# Patient Record
Sex: MALE | Race: WHITE | Employment: OTHER | URBAN - METROPOLITAN AREA
[De-identification: names, ages, dates, MRNs, and addresses within clinical notes are randomized per-mention and may not be internally consistent; named-entity substitution may affect disease eponyms.]

---

## 2018-09-07 ENCOUNTER — OFFICE VISIT (OUTPATIENT)
Dept: INTERNAL MEDICINE | Facility: CLINIC | Age: 28
End: 2018-09-07
Payer: COMMERCIAL

## 2018-09-07 VITALS
SYSTOLIC BLOOD PRESSURE: 120 MMHG | DIASTOLIC BLOOD PRESSURE: 82 MMHG | WEIGHT: 233.4 LBS | HEIGHT: 71 IN | RESPIRATION RATE: 16 BRPM | HEART RATE: 112 BPM | BODY MASS INDEX: 32.68 KG/M2 | OXYGEN SATURATION: 95 % | TEMPERATURE: 98.1 F

## 2018-09-07 DIAGNOSIS — Z00.00 ROUTINE HISTORY AND PHYSICAL EXAMINATION OF ADULT: Primary | ICD-10-CM

## 2018-09-07 PROCEDURE — 99395 PREV VISIT EST AGE 18-39: CPT | Mod: 25 | Performed by: FAMILY MEDICINE

## 2018-09-07 PROCEDURE — 90471 IMMUNIZATION ADMIN: CPT | Performed by: FAMILY MEDICINE

## 2018-09-07 PROCEDURE — 90715 TDAP VACCINE 7 YRS/> IM: CPT | Performed by: FAMILY MEDICINE

## 2018-09-07 NOTE — NURSING NOTE
"/82  Pulse 112  Temp 98.1  F (36.7  C) (Oral)  Resp 16  Ht 5' 11\" (1.803 m)  Wt 233 lb 6.4 oz (105.9 kg)  SpO2 95%  BMI 32.55 kg/m2  Patient in for annual male physical.  Jennifer Barnes CMA    "

## 2018-09-07 NOTE — MR AVS SNAPSHOT
After Visit Summary   9/7/2018    Bravo Ortega    MRN: [id number]           Patient Information     Date Of Birth          1990        Visit Information        Provider Department      9/7/2018 12:40 PM Scott Zaidi MD [facility]        Today's Diagnoses     Routine history and physical examination of adult    -  1      Care Instructions      Preventive Health Recommendations  Male Ages 26 - 39    Yearly exam:             See your health care provider every year in order to  o   Review health changes.   o   Discuss preventive care.    o   Review your medicines if your doctor has prescribed any.    You should be tested each year for STDs (sexually transmitted diseases), if you re at risk.     After age 35, talk to your provider about cholesterol testing. If you are at risk for heart disease, have your cholesterol tested at least every 5 years.     If you are at risk for diabetes, you should have a diabetes test (fasting glucose).  Shots: Get a flu shot each year. Get a tetanus shot every 10 years.     Nutrition:    Eat at least 5 servings of fruits and vegetables daily.     Eat whole-grain bread, whole-wheat pasta and brown rice instead of white grains and rice.     Get adequate Calcium and Vitamin D.     Lifestyle    Exercise for at least 150 minutes a week (30 minutes a day, 5 days a week). This will help you control your weight and prevent disease.     Limit alcohol to one drink per day.     No smoking.     Wear sunscreen to prevent skin cancer.     See your dentist every six months for an exam and cleaning.             Follow-ups after your visit        Future tests that were ordered for you today     Open Future Orders        Priority Expected Expires Ordered    Comprehensive metabolic panel (BMP + Alb, Alk Phos, ALT, AST, Total. Bili, TP) Routine 9/10/2018 9/28/2018 9/7/2018    Lipid Profile (Chol, Trig, HDL, LDL calc) Routine 9/10/2018 9/28/2018 9/7/2018     "**Testosterone Free and Total FUTURE anytime Routine 9/10/2018 2018 2018            Who to contact     If you have questions or need follow up information about today's clinic visit or your schedule please contact [facility] directly at [phone].  Normal or non-critical lab and imaging results will be communicated to you by MyChart, letter or phone within 4 business days after the clinic has received the results. If you do not hear from us within 7 days, please contact the clinic through [employer] or phone. If you have a critical or abnormal lab result, we will notify you by phone as soon as possible.  Submit refill requests through [employer] or call your pharmacy and they will forward the refill request to us. Please allow 3 business days for your refill to be completed.          Additional Information About Your Visit        MyChart Information     [employer] lets you send messages to your doctor, view your test results, renew your prescriptions, schedule appointments and more. To sign up, go to [email or web address] . Click on \"Log in\" on the left side of the screen, which will take you to the Welcome page. Then click on \"Sign up Now\" on the right side of the page.     You will be asked to enter the access code listed below, as well as some personal information. Please follow the directions to create your username and password.     Your access code is: [email or web address]  Expires: 2018 12:42 PM     Your access code will  in 90 days. If you need help or a new code, please call your [facility] or [phone].        Care EveryWhere ID     This is your Care EveryWhere ID. This could be used by other organizations to access your [place] medical records  [id number]        Your Vitals Were     Pulse Temperature Respirations Height Pulse Oximetry BMI (Body Mass Index)    112 98.1  F (36.7  C) (Oral) 16 5' 11\" (1.803 m) 95% 32.55 kg/m2       Blood Pressure from Last 3 Encounters: "   09/07/18 120/82    Weight from Last 3 Encounters:   09/07/18 233 lb 6.4 oz (105.9 kg)               Primary Care Provider Fax #    Physician No Ref-Primary [phone]       No address on file        Equal Access to Services     SAADIA IVORY : Abhay ami pinto saulo Soomaali, waaxda luqadaha, qaybta kaalmada adeegyada, david carbajalashley gandarachelly catalan boogie melendez. So [facility] [phone].    ATENCIÓN: Si habla español, tiene a bae disposición servicios gratuitos de asistencia lingüística. Llame al [phone].    We comply with applicable federal civil rights laws and [place] laws. We do not discriminate on the basis of race, color, national origin, age, disability, sex, sexual orientation, or gender identity.            Thank you!     Thank you for choosing [facility]  for your care. Our goal is always to provide you with excellent care. Hearing back from our patients is one way we can continue to improve our services. Please take a few minutes to complete the written survey that you may receive in the mail after your visit with us. Thank you!             Your Updated Medication List - Protect others around you: Learn how to safely use, store and throw away your medicines at www.disposemymeds.org.      Notice  As of 9/7/2018  1:17 PM    You have not been prescribed any medications.

## 2018-09-07 NOTE — PROGRESS NOTES
SUBJECTIVE:   CC: Bravo Ortega is an 27 year old male who presents for preventative health visit.     Physical   Annual:     Getting at least 3 servings of Calcium per day:  Yes    Bi-annual eye exam:  Yes    Dental care twice a year:  Yes    Sleep apnea or symptoms of sleep apnea:  None    Diet:  Regular (no restrictions)    Frequency of exercise:  6-7 days/week    Duration of exercise:  30-45 minutes    Taking medications regularly:  Not Applicable    Additional concerns today:  YES            Today's PHQ-2 Score:   PHQ-2 ( 1999 [employer]) 9/7/2018   Q1: Little interest or pleasure in doing things 0   Q2: Feeling down, depressed or hopeless 0   PHQ-2 Score 0   Q1: Little interest or pleasure in doing things Not at all   Q2: Feeling down, depressed or hopeless Not at all   PHQ-2 Score 0       Abuse: Current or Past(Physical, Sexual or Emotional)- No  Do you feel safe in your environment - Yes    Social History   Substance Use Topics     Smoking status: Never Smoker     Smokeless tobacco: Never Used     Alcohol use Yes     Alcohol Use 9/7/2018   If you drink alcohol do you typically have greater than 3 drinks per day OR greater than 7 drinks per week? No   No flowsheet data found.    Last PSA: No results found for: PSA    Reviewed orders with patient. Reviewed health maintenance and updated orders accordingly - Yes      Reviewed and updated as needed this visit by clinical staff  Tobacco  Allergies  Meds  Med Hx  Surg Hx  Fam Hx  Soc Hx        Reviewed and updated as needed this visit by Provider            Review of Systems  CONSTITUTIONAL: NEGATIVE for fever, chills, change in weight  INTEGUMENTARY/SKIN: NEGATIVE for worrisome rashes, moles or lesions  EYES: NEGATIVE for vision changes or irritation  ENT: NEGATIVE for ear, mouth and throat problems  RESP: NEGATIVE for significant cough or SOB  CV: NEGATIVE for chest pain, palpitations or peripheral edema  GI: NEGATIVE for nausea, abdominal pain, heartburn,  "or change in bowel habits   male: negative for dysuria, hematuria, decreased urinary stream, erectile dysfunction, urethral discharge  MUSCULOSKELETAL: NEGATIVE for significant arthralgias or myalgia  NEURO: NEGATIVE for weakness, dizziness or paresthesias  PSYCHIATRIC: NEGATIVE for changes in mood or affect    OBJECTIVE:   /82  Pulse 112  Temp 98.1  F (36.7  C) (Oral)  Resp 16  Ht 5' 11\" (1.803 m)  Wt 233 lb 6.4 oz (105.9 kg)  SpO2 95%  BMI 32.55 kg/m2    Physical Exam  GENERAL: healthy, alert and no distress  EYES: Eyes grossly normal to inspection, PERRL and conjunctivae and sclerae normal  HENT: ear canals and TM's normal, nose and mouth without ulcers or lesions  NECK: no adenopathy, no asymmetry, masses, or scars and thyroid normal to palpation  RESP: lungs clear to auscultation - no rales, rhonchi or wheezes  CV: regular rate and rhythm, normal S1 S2, no S3 or S4, no murmur, click or rub, no peripheral edema and peripheral pulses strong  ABDOMEN: soft, nontender, no hepatosplenomegaly, no masses and bowel sounds normal   (male): normal male genitalia without lesions or urethral discharge, no hernia  MS: no gross musculoskeletal defects noted, no edema  SKIN: no suspicious lesions or rashes  NEURO: Normal strength and tone, mentation intact and speech normal  PSYCH: mentation appears normal, affect normal/bright    Diagnostic Test Results:  none     ASSESSMENT/PLAN:   1. Routine history and physical examination of adult    - Comprehensive metabolic panel (BMP + Alb, Alk Phos, ALT, AST, Total. Bili, TP); Future  - Lipid Profile (Chol, Trig, HDL, LDL calc); Future  - **Testosterone Free and Total FUTURE anytime; Future  - TDAP VACCINE (ADACEL)    COUNSELING:   Reviewed preventive health counseling, as reflected in patient instructions       Regular exercise       Healthy diet/nutrition    BP Readings from Last 1 Encounters:   09/07/18 120/82     Estimated body mass index is 32.55 kg/(m^2) as " "calculated from the following:    Height as of this encounter: 5' 11\" (1.803 m).    Weight as of this encounter: 233 lb 6.4 oz (105.9 kg).      Weight management plan: low carb, exer     reports that he has never smoked. He has never used smokeless tobacco.      Counseling Resources:  ATP IV Guidelines  Pooled Cohorts Equation Calculator  FRAX Risk Assessment  ICSI Preventive Guidelines  Dietary Guidelines for Americans, 2010  USDA's MyPlate  ASA Prophylaxis  Lung CA Screening    Scott Zaidi MD  [facility]  Answers for HPI/ROS submitted by the patient on 9/7/2018   PHQ-2 Score: 0    "

## 2018-09-12 DIAGNOSIS — Z00.00 ROUTINE HISTORY AND PHYSICAL EXAMINATION OF ADULT: ICD-10-CM

## 2018-09-12 PROCEDURE — 36415 COLL VENOUS BLD VENIPUNCTURE: CPT | Performed by: FAMILY MEDICINE

## 2018-09-12 PROCEDURE — 80061 LIPID PANEL: CPT | Performed by: FAMILY MEDICINE

## 2018-09-12 PROCEDURE — 80053 COMPREHEN METABOLIC PANEL: CPT | Performed by: FAMILY MEDICINE

## 2018-09-12 PROCEDURE — 84270 ASSAY OF SEX HORMONE GLOBUL: CPT | Performed by: FAMILY MEDICINE

## 2018-09-12 PROCEDURE — 84403 ASSAY OF TOTAL TESTOSTERONE: CPT | Performed by: FAMILY MEDICINE

## 2018-09-13 LAB
ALBUMIN SERPL-MCNC: 3.7 G/DL (ref 3.4–5)
ALP SERPL-CCNC: 21 U/L (ref 40–150)
ALT SERPL W P-5'-P-CCNC: 28 U/L (ref 0–70)
ANION GAP SERPL CALCULATED.3IONS-SCNC: 9 MMOL/L (ref 3–14)
AST SERPL W P-5'-P-CCNC: 20 U/L (ref 0–45)
BILIRUB SERPL-MCNC: 0.4 MG/DL (ref 0.2–1.3)
BUN SERPL-MCNC: 15 MG/DL (ref 7–30)
CALCIUM SERPL-MCNC: 8.5 MG/DL (ref 8.5–10.1)
CHLORIDE SERPL-SCNC: 106 MMOL/L (ref 94–109)
CHOLEST SERPL-MCNC: 174 MG/DL
CO2 SERPL-SCNC: 26 MMOL/L (ref 20–32)
CREAT SERPL-MCNC: 1.12 MG/DL (ref 0.66–1.25)
GFR SERPL CREATININE-BSD FRML MDRD: 77 ML/MIN/1.7M2
GLUCOSE SERPL-MCNC: 85 MG/DL (ref 70–99)
HDLC SERPL-MCNC: 39 MG/DL
LDLC SERPL CALC-MCNC: 119 MG/DL
NONHDLC SERPL-MCNC: 135 MG/DL
POTASSIUM SERPL-SCNC: 4.3 MMOL/L (ref 3.4–5.3)
PROT SERPL-MCNC: 7.2 G/DL (ref 6.8–8.8)
SODIUM SERPL-SCNC: 141 MMOL/L (ref 133–144)
TRIGL SERPL-MCNC: 79 MG/DL

## 2018-09-14 ENCOUNTER — MYC MEDICAL ADVICE (OUTPATIENT)
Dept: INTERNAL MEDICINE | Facility: CLINIC | Age: 28
End: 2018-09-14

## 2018-09-17 LAB
SHBG SERPL-SCNC: 23 NMOL/L (ref 11–80)
TESTOST FREE SERPL-MCNC: 10.32 NG/DL (ref 4.7–24.4)
TESTOST SERPL-MCNC: 417 NG/DL (ref 240–950)

## 2019-12-09 ENCOUNTER — HEALTH MAINTENANCE LETTER (OUTPATIENT)
Age: 29
End: 2019-12-09

## 2021-04-21 ENCOUNTER — IMMUNIZATION (OUTPATIENT)
Dept: NURSING | Facility: CLINIC | Age: 31
End: 2021-04-21
Payer: COMMERCIAL

## 2021-04-21 PROCEDURE — 0001A PR COVID VAC PFIZER DIL RECON 30 MCG/0.3 ML IM: CPT

## 2021-04-21 PROCEDURE — 91300 PR COVID VAC PFIZER DIL RECON 30 MCG/0.3 ML IM: CPT

## 2021-05-12 ENCOUNTER — IMMUNIZATION (OUTPATIENT)
Dept: NURSING | Facility: CLINIC | Age: 31
End: 2021-05-12
Attending: INTERNAL MEDICINE
Payer: COMMERCIAL

## 2021-05-12 PROCEDURE — 91300 PR COVID VAC PFIZER DIL RECON 30 MCG/0.3 ML IM: CPT

## 2021-05-12 PROCEDURE — 0002A PR COVID VAC PFIZER DIL RECON 30 MCG/0.3 ML IM: CPT

## 2021-08-09 ENCOUNTER — OFFICE VISIT (OUTPATIENT)
Dept: URGENT CARE | Facility: URGENT CARE | Age: 31
End: 2021-08-09
Payer: COMMERCIAL

## 2021-08-09 VITALS
TEMPERATURE: 98 F | OXYGEN SATURATION: 98 % | SYSTOLIC BLOOD PRESSURE: 151 MMHG | BODY MASS INDEX: 35.15 KG/M2 | DIASTOLIC BLOOD PRESSURE: 100 MMHG | WEIGHT: 252 LBS | HEART RATE: 94 BPM

## 2021-08-09 DIAGNOSIS — R05.9 COUGH: Primary | ICD-10-CM

## 2021-08-09 DIAGNOSIS — J02.9 PHARYNGITIS, UNSPECIFIED ETIOLOGY: ICD-10-CM

## 2021-08-09 PROCEDURE — U0003 INFECTIOUS AGENT DETECTION BY NUCLEIC ACID (DNA OR RNA); SEVERE ACUTE RESPIRATORY SYNDROME CORONAVIRUS 2 (SARS-COV-2) (CORONAVIRUS DISEASE [COVID-19]), AMPLIFIED PROBE TECHNIQUE, MAKING USE OF HIGH THROUGHPUT TECHNOLOGIES AS DESCRIBED BY CMS-2020-01-R: HCPCS | Performed by: STUDENT IN AN ORGANIZED HEALTH CARE EDUCATION/TRAINING PROGRAM

## 2021-08-09 PROCEDURE — 99213 OFFICE O/P EST LOW 20 MIN: CPT | Performed by: STUDENT IN AN ORGANIZED HEALTH CARE EDUCATION/TRAINING PROGRAM

## 2021-08-09 PROCEDURE — U0005 INFEC AGEN DETEC AMPLI PROBE: HCPCS | Performed by: STUDENT IN AN ORGANIZED HEALTH CARE EDUCATION/TRAINING PROGRAM

## 2021-08-09 RX ORDER — PREDNISONE 10 MG/1
TABLET ORAL
Qty: 8 TABLET | Refills: 0 | Status: SHIPPED | OUTPATIENT
Start: 2021-08-09 | End: 2021-08-14

## 2021-08-10 LAB — SARS-COV-2 RNA RESP QL NAA+PROBE: NEGATIVE

## 2021-08-10 NOTE — PATIENT INSTRUCTIONS
Patient Education     Acute Viral Pharyngitis (Sore Throat)    You or your child have a sore throat (pharyngitis). This infection is caused by a virus. It can cause throat pain that is worse when swallowing, aching all over, headache, and fever. The infection may be spread by coughing, kissing, or touching others after touching your mouth or nose. Antibiotic medicines don't work against viruses. They are not used for treating this illness.   Home care    If symptoms are severe, you or your child should rest at home. Return to work or school when you, or your child, feel well enough.     You or your child should drink plenty of fluids to prevent dehydration.    Adults, and children 5 years and older, can use throat lozenges or numbing throat sprays to help reduce pain. Gargling with warm salt water will also help reduce throat pain. Dissolve 1/2 teaspoon of salt in 1 glass of warm water. Children can sip on juice or an ice pop. Children 5 years and older can also suck on a lollipop or hard candy. (Hard candy and lozenges can be a choking hazard in children younger than 5 years.)    Don t eat salty or spicy foods or give them to your child. These can be irritating to the throat.  Medicines for a child: You can give your child acetaminophen for fever, fussiness, or discomfort. In babies over 6 months of age, you may use ibuprofen as well as acetaminophen. If your child has chronic liver or kidney disease or ever had a stomach ulcer or gastrointestinal bleeding, talk with your child s healthcare provider before giving these medicines. Aspirin should never be used by any child under 18 years of age who has a fever. It may cause severe liver damage and death. Don't give your child any other medicine without first asking your child's healthcare provider, especially the first time.   Medicines for an adult: You may use acetaminophen, naproxen, or ibuprofen to control pain or fever, unless another medicine was prescribed for  this. If you have chronic liver or kidney disease or ever had a stomach ulcer or gastrointestinal bleeding, talk with your healthcare provider before using these medicines.   Follow-up care  Follow up with a healthcare provider or as advised if you or your child are not getting better over the next week.   When to get medical advice  Call your healthcare provider right away if any of these occur:     Fever (see Fever and children, below)     New or worsening ear pain, sinus pain, or headache    Painful lumps in the back of neck    Stiff neck    Lymph nodes are getting larger    Can t open mouth wide due to throat pain    New rash    Other symptoms are getting worse  Call 911  Call 911 or get medical care right away if any of these occur:       Trouble breathing or noisy breathing    Muffled voice    Can't swallow liquids, a lot of drooling, or any other symptoms that may mean worsening swelling in the throat    Signs of dehydration such as very dark urine or no urine, sunken eyes, dizziness    Fever and children  Use a digital thermometer to check your child s temperature. Don t use a mercury thermometer. There are different kinds and uses of digital thermometers. They include:     Rectal. For children younger than 3 years, a rectal temperature is the most accurate.    Forehead (temporal).  This works for children age 3 months and older. If a child under 3 months old has signs of illness, this can be used for a first pass. The provider may want to confirm with a rectal temperature.    Ear (tympanic). Ear temperatures are accurate after 6 months of age, but not before.    Armpit (axillary).  This is the least reliable but may be used for a first pass to check a child of any age with signs of illness. The provider may want to confirm with a rectal temperature.    Mouth (oral). Don t use a thermometer in your child s mouth until he or she is at least 4 years old.  Use the rectal thermometer with care. Follow the  product maker s directions for correct use. Insert it gently. Label it and make sure it s not used in the mouth. It may pass on germs from the stool. If you don t feel OK using a rectal thermometer, ask the healthcare provider what type to use instead. When you talk with any healthcare provider about your child s fever, tell him or her which type you used.   Below are guidelines to know if your young child has a fever. Your child s healthcare provider may give you different numbers for your child. Follow your provider s specific instructions.   Fever readings for a baby under 3 months old:     First, ask your child s healthcare provider how you should take the temperature.    Rectal or forehead: 100.4 F (38 C) or higher    Armpit: 99 F (37.2 C) or higher  Fever readings for a child age 3 months to 36 months (3 years):     Rectal, forehead, or ear: 102 F (38.9 C) or higher    Armpit: 101 F (38.3 C) or higher  Call the healthcare provider in these cases:     Repeated temperature of 104 F (40 C) or higher in a child of any age    Fever of 100.4 or higher in baby younger than 3 months    Fever that lasts more than 24 hours in a child under age 2    Fever that lasts for 3 days in a child age 2 or older  Sly last reviewed this educational content on 2/1/2020

## 2021-08-10 NOTE — PROGRESS NOTES
Assessment & Plan   Cough  Vaccinated but given Delta variant will test for Covid.  - Symptomatic COVID-19 Virus (Coronavirus) by PCR Nasopharyngeal    Pharyngitis, unspecified etiology  Suspect viral vs allergic etiology. Low suspicion for sinusitis or other bacterial pathology. Recommended short course of prednisone given degree of throat pain and hoarseness.   - predniSONE (DELTASONE) 10 MG tablet  Dispense: 8 tablet; Refill: 0           Return if symptoms worsen or fail to improve.    Loreta Preciado MD  [facility] MARCOS Cordon is a 30 year old male who presents to clinic today for the following health issues:  Chief Complaint   Patient presents with     Urgent Care     Sinus Problem     had sinus infection 2 weeks ago,congested, coughing up     HPI    URI Adult  Sore throat, productive cough, fatigue  Started 3 weeks go and was told viral and take zyrtec and helped initially; hoarse voice  Has not been on antibiotics  No fevers (taking temp daily)  Sleep is ok; very tired past week and hald  Appetite suppressed a little  Worked out 2x last week  Onset of symptoms was 3 week(s) ago.  Course of illness is worsening.    Severity mild  Current and Associated symptoms: cough - productive, sore throat, hoarse voice and fatigue  Treatment measures tried include Tylenol/Ibuprofen, Decongestants and Antihistamine.  Predisposing factors include seasonal allergies.      Review of Systems  Constitutional, HEENT, cardiovascular, pulmonary, gi  systems are negative, except as otherwise noted.      Objective    BP (!) 151/100   Pulse 94   Temp 98  F (36.7  C) (Tympanic)   Wt 114.3 kg (252 lb)   SpO2 98%   BMI 35.15 kg/m    Physical Exam   GENERAL: healthy, alert and no distress  EYES: Eyes grossly normal to inspection, conjunctivae and sclerae normal  HENT: ear canals and TM's normal, nose and mouth without ulcers or lesions  NECK: no adenopathyn  RESP: lungs clear to auscultation  - no rales, rhonchi or wheezes  CV: regular rate and rhythm, normal S1 S2, no S3 or S4, no murmur, click or rub  ABDOMEN: soft, nontender  MS: no gross musculoskeletal defects noted, no edema  SKIN: no suspicious lesions or rashes

## 2021-08-29 ENCOUNTER — HEALTH MAINTENANCE LETTER (OUTPATIENT)
Age: 31
End: 2021-08-29

## 2021-09-16 ENCOUNTER — OFFICE VISIT (OUTPATIENT)
Dept: URGENT CARE | Facility: URGENT CARE | Age: 31
End: 2021-09-16

## 2021-09-16 VITALS
HEART RATE: 86 BPM | DIASTOLIC BLOOD PRESSURE: 85 MMHG | SYSTOLIC BLOOD PRESSURE: 137 MMHG | OXYGEN SATURATION: 98 % | TEMPERATURE: 98.5 F

## 2021-09-16 DIAGNOSIS — R07.0 THROAT PAIN: ICD-10-CM

## 2021-09-16 DIAGNOSIS — R05.9 COUGH: ICD-10-CM

## 2021-09-16 DIAGNOSIS — R06.02 SHORTNESS OF BREATH: Primary | ICD-10-CM

## 2021-09-16 DIAGNOSIS — R07.9 ACUTE CHEST PAIN: ICD-10-CM

## 2021-09-16 LAB
BASOPHILS # BLD AUTO: 0 10E3/UL (ref 0–0.2)
BASOPHILS NFR BLD AUTO: 0 %
D DIMER PPP FEU-MCNC: <0.27 UG/ML FEU (ref 0–0.5)
DEPRECATED S PYO AG THROAT QL EIA: NEGATIVE
EOSINOPHIL # BLD AUTO: 0.1 10E3/UL (ref 0–0.7)
EOSINOPHIL NFR BLD AUTO: 2 %
ERYTHROCYTE [DISTWIDTH] IN BLOOD BY AUTOMATED COUNT: 12.4 % (ref 10–15)
HCT VFR BLD AUTO: 45 % (ref 40–53)
HGB BLD-MCNC: 15 G/DL (ref 13.3–17.7)
LYMPHOCYTES # BLD AUTO: 2.8 10E3/UL (ref 0.8–5.3)
LYMPHOCYTES NFR BLD AUTO: 36 %
MCH RBC QN AUTO: 28.8 PG (ref 26.5–33)
MCHC RBC AUTO-ENTMCNC: 33.3 G/DL (ref 31.5–36.5)
MCV RBC AUTO: 86 FL (ref 78–100)
MONOCYTES # BLD AUTO: 0.9 10E3/UL (ref 0–1.3)
MONOCYTES NFR BLD AUTO: 12 %
NEUTROPHILS # BLD AUTO: 3.9 10E3/UL (ref 1.6–8.3)
NEUTROPHILS NFR BLD AUTO: 51 %
PLATELET # BLD AUTO: 308 10E3/UL (ref 150–450)
RBC # BLD AUTO: 5.21 10E6/UL (ref 4.4–5.9)
TROPONIN I SERPL-MCNC: <0.015 UG/L (ref 0–0.04)
WBC # BLD AUTO: 7.7 10E3/UL (ref 4–11)

## 2021-09-16 PROCEDURE — U0005 INFEC AGEN DETEC AMPLI PROBE: HCPCS | Performed by: NURSE PRACTITIONER

## 2021-09-16 PROCEDURE — 85025 COMPLETE CBC W/AUTO DIFF WBC: CPT | Performed by: NURSE PRACTITIONER

## 2021-09-16 PROCEDURE — 93000 ELECTROCARDIOGRAM COMPLETE: CPT | Performed by: NURSE PRACTITIONER

## 2021-09-16 PROCEDURE — 99215 OFFICE O/P EST HI 40 MIN: CPT | Performed by: NURSE PRACTITIONER

## 2021-09-16 PROCEDURE — 84484 ASSAY OF TROPONIN QUANT: CPT | Performed by: NURSE PRACTITIONER

## 2021-09-16 PROCEDURE — 36415 COLL VENOUS BLD VENIPUNCTURE: CPT | Performed by: NURSE PRACTITIONER

## 2021-09-16 PROCEDURE — 85379 FIBRIN DEGRADATION QUANT: CPT | Performed by: NURSE PRACTITIONER

## 2021-09-16 PROCEDURE — 87651 STREP A DNA AMP PROBE: CPT | Performed by: NURSE PRACTITIONER

## 2021-09-16 PROCEDURE — U0003 INFECTIOUS AGENT DETECTION BY NUCLEIC ACID (DNA OR RNA); SEVERE ACUTE RESPIRATORY SYNDROME CORONAVIRUS 2 (SARS-COV-2) (CORONAVIRUS DISEASE [COVID-19]), AMPLIFIED PROBE TECHNIQUE, MAKING USE OF HIGH THROUGHPUT TECHNOLOGIES AS DESCRIBED BY CMS-2020-01-R: HCPCS | Performed by: NURSE PRACTITIONER

## 2021-09-16 RX ORDER — ALBUTEROL SULFATE 90 UG/1
2 AEROSOL, METERED RESPIRATORY (INHALATION) EVERY 4 HOURS PRN
Qty: 18 G | Refills: 0 | Status: SHIPPED | OUTPATIENT
Start: 2021-09-16 | End: 2021-10-16

## 2021-09-16 NOTE — PROGRESS NOTES
Chief Complaint   Patient presents with     Urgent Care     difficulty breathing, cough, chest pain, upper back pain and sore throat - reports negative covid result on 09/11/2021         ICD-10-CM    1. Shortness of breath  R06.02 CBC with platelets and differential     Symptomatic COVID-19 Virus (Coronavirus) by PCR     CBC with platelets and differential     albuterol (PROAIR HFA/PROVENTIL HFA/VENTOLIN HFA) 108 (90 Base) MCG/ACT inhaler     Symptomatic COVID-19 Virus (Coronavirus) by PCR Nose   2. Acute chest pain  R07.9 EKG 12-lead complete w/read - Clinics     CBC with platelets and differential     Troponin I     D dimer, quantitative     CBC with platelets and differential     Troponin I     D dimer, quantitative   3. Throat pain  R07.0 Streptococcus A Rapid Screen w/Reflex to PCR     CBC with platelets and differential     Symptomatic COVID-19 Virus (Coronavirus) by PCR     Streptococcus A Rapid Scr w Reflx to PCR - Lab Collect     Group A Streptococcus PCR Throat Swab     CBC with platelets and differential     Symptomatic COVID-19 Virus (Coronavirus) by PCR Nose   4. Cough  R05 albuterol (PROAIR HFA/PROVENTIL HFA/VENTOLIN HFA) 108 (90 Base) MCG/ACT inhaler   Advised patient to stay isolated until results of COVID-19 test are back.  Provided reassurance that there is no evidence of a clot in his lung or his heart being related to this chest pain or shortness of breath.  Advised patient to use over-the-counter medications to treat symptoms and to go to the emergency room if shortness of breath becomes severe.  Rest and drink plenty of fluids.    54 minutes total time spent reviewing patient's chart, completing history and physical exam, discussing plan of care and completing documentation.    Medical Decision Making    Differential diagnosis for patient's chest pain include but is not limited to: Acute myocardial infarction, pneumothorax, pleurisy, pericarditis, myocarditis, asthma exacerbation, pneumonia,  lung abscess, aortic dissection, thoracic aortic aneurysm, costochondritis, pulmonary embolus, rib fractures, muscular pain, GERD, cholecystitis, pancreatitis, diaphragmatic irritation.      EKG - Reviewed and interpreted by me appears normal, NSR with no acute changes.    Results for orders placed or performed in visit on 09/16/21 (from the past 24 hour(s))   Streptococcus A Rapid Screen w/Reflex to PCR    Specimen: Throat; Swab   Result Value Ref Range    Group A Strep antigen Negative Negative   D dimer, quantitative   Result Value Ref Range    D-Dimer Quantitative <0.27 0.00 - 0.50 ug/mL FEU    Narrative    This D-dimer assay is intended for use in conjunction with a clinical pretest probability assessment model to exclude pulmonary embolism (PE) and deep venous thrombosis (DVT) in outpatients suspected of PE or DVT. The cut-off value is 0.50 ug/mL FEU.   CBC with platelets and differential    Narrative    The following orders were created for panel order CBC with platelets and differential.  Procedure                               Abnormality         Status                     ---------                               -----------         ------                     CBC with platelets and d...[[id number]]                      Final result                 Please view results for these tests on the individual orders.   Troponin I   Result Value Ref Range    Troponin I <0.015 0.000 - 0.045 ug/L   CBC with platelets and differential   Result Value Ref Range    WBC Count 7.7 4.0 - 11.0 10e3/uL    RBC Count 5.21 4.40 - 5.90 10e6/uL    Hemoglobin 15.0 13.3 - 17.7 g/dL    Hematocrit 45.0 40.0 - 53.0 %    MCV 86 78 - 100 fL    MCH 28.8 26.5 - 33.0 pg    MCHC 33.3 31.5 - 36.5 g/dL    RDW 12.4 10.0 - 15.0 %    Platelet Count 308 150 - 450 10e3/uL    % Neutrophils 51 %    % Lymphocytes 36 %    % Monocytes 12 %    % Eosinophils 2 %    % Basophils 0 %    Absolute Neutrophils 3.9 1.6 - 8.3 10e3/uL    Absolute Lymphocytes 2.8 0.8 -  5.3 10e3/uL    Absolute Monocytes 0.9 0.0 - 1.3 10e3/uL    Absolute Eosinophils 0.1 0.0 - 0.7 10e3/uL    Absolute Basophils 0.0 0.0 - 0.2 10e3/uL       Subjective     Bravo Ortega is an 30 year old male who presents to clinic today for cough, sore throat, muscle aches, shortness of breath. He has had several illnesses over the last couple of months, this most recent bout started about 7 days ago.       ROS: 10 point ROS neg other than the symptoms noted above in the HPI.       Objective    /85   Pulse 86   Temp 98.5  F (36.9  C) (Tympanic)   SpO2 98%     Physical Exam       GENERAL APPEARANCE: healthy appearing, alert     EYES: PERRL, EOMI, sclera non-icteric     HENT: oral exam benign, mucus membranes intact, without ulcers or lesions     NECK: no adenopathy or asymmetry, thyroid normal to palpation     RESP: lungs clear to auscultation - no rales, rhonchi or wheezes     CV: regular rates and rhythm, no murmurs, rubs, or gallop     ABDOMEN:  soft, nontender, no HSM or masses and bowel sounds normal     MS: extremities normal- no gross deformities noted; normal muscle tone.     SKIN: no suspicious lesions or rashes     NEURO: Normal strength and tone, mentation intact and speech normal     PSYCH: normal thought process; no significant mood disturbance    Patient Instructions       Patient Education     Viral Upper Respiratory Illness (Adult)    You have a viral upper respiratory illness (URI), which is another term for the common cold. This illness is contagious during the first few days. It is spread through the air by coughing and sneezing. It may also be spread by direct contact (touching the sick person and then touching your own eyes, nose, or mouth). Frequent handwashing will decrease risk of spread. Most viral illnesses go away within 7 to 10 days with rest and simple home remedies. Sometimes the illness may last for several weeks. Antibiotics will not kill a virus, and they are generally not  prescribed for this condition.  Home care    If symptoms are severe, rest at home for the first 2 to 3 days. When you resume activity, don't let yourself get too tired.    Don't smoke. If you need help stopping, talk with your healthcare provider.    Avoid being exposed to cigarette smoke (yours or others ).    You may use acetaminophen or ibuprofen to control pain and fever, unless another medicine was prescribed. If you have chronic liver or kidney disease, have ever had a stomach ulcer or gastrointestinal bleeding, or are taking blood-thinning medicines, talk with your healthcare provider before using these medicines. Aspirin should never be given to anyone under 18 years of age who is ill with a viral infection or fever. It may cause severe liver or brain damage.    Your appetite may be poor, so a light diet is fine. Stay well hydrated by drinking 6 to 8 glasses of fluids per day (water, soft drinks, juices, tea, or soup). Extra fluids will help loosen secretions in the nose and lungs.    Over-the-counter cold medicines will not shorten the length of time you re sick, but they may be helpful for the following symptoms: cough, sore throat, and nasal and sinus congestion. If you take prescription medicines, ask your healthcare provider or pharmacist which over-the-counter medicines are safe to use. (Note: Don't use decongestants if you have high blood pressure.)  Follow-up care  Follow up with your healthcare provider, or as advised.  When to seek medical advice  Call your healthcare provider right away if any of these occur:    Cough with lots of colored sputum (mucus)    Severe headache; face, neck, or ear pain    Difficulty swallowing due to throat pain    Fever of 100.4 F (38 C) or higher, or as directed by your healthcare provider  Call 911  Call 911 if any of these occur:    Chest pain, shortness of breath, wheezing, or difficulty breathing    Coughing up blood    Very severe pain with swallowing, especially  "if it goes along with a muffled voice   [employer] last reviewed this educational content on 6/1/2018 2000-2021 [employer]. All rights reserved. This information is not intended as a substitute for professional medical care. Always follow your healthcare professional's instructions.         After Your COVID-19 (Coronavirus) Test  You have been tested for COVID-19 (coronavirus).   If you'll have surgery in the next few days, we'll let you know ahead of time if you have the virus. Please call your surgeon's office with any questions.  For all other patients: Results are usually available in [employer] within 2 to 3 days.   If you do not have a [employer] account, you'll get a letter in the mail in about 7 to 10 days.   mPATHt is often the fastest way to get test results. Please sign up if you do not already have a [employer] account. See the handout Getting COVID-19 Test Results in [employer] for help.  What if my test result is positive?  If your test is positive and you have not viewed your result in [employer], you'll get a phone call with your result. (A positive test means that you have the virus.)     Follow the tips under \"How do I self-isolate?\" below for 10 days (20 days if you have a weak immune system).    You don't need to be retested for COVID-19 before going back to school or work. As long as you're fever-free and feeling better, you can go back to school, work and other activities after waiting the 10 or 20 days.  What if I have questions after I get my results?  If you have questions about your results, please visit our testing website at [email or web address].   After 7 to 10 days, if you have not gotten your results:     Call [phone] ([phone]) and ask to speak with our COVID-19 results team.    If you're being treated at an infusion center: Call your infusion center directly.  What are the symptoms of COVID-19?  Cough, fever and trouble breathing are the " "most common signs of COVID-19.  Other symptoms can include new headaches, new muscle or body aches, new and unexplained fatigue (feeling very tired), chills, sore throat, congestion (stuffy or runny nose), diarrhea (loose poop), loss of taste or smell, belly pain, and nausea or vomiting (feeling sick to your stomach or throwing up).  You may already have symptoms of COVID-19, or they may show up later.  What should I do if I have symptoms?  If you're having surgery: Call your surgeon's office.  For all other patients: Stay home and away from others (self-isolate) until ...    You've had no fever--and no medicine that reduces fever--for 1 full day (24 hours), AND    Other symptoms have gotten better. For example, your cough or breathing has improved, AND    At least 10 days have passed since your symptoms first started.  How do I self-isolate?  1. Stay in your own room, even for meals. Use your own bathroom if you can.  2. Stay away from others in your home. No hugging, kissing or shaking hands. No visitors.  3. Don't go to work, school or anywhere else.  4. Clean \"high touch\" surfaces often (doorknobs, counters, handles). Use household cleaning spray or wipes. You'll find a full list of  on the EPA website: www.epa.gov/pesticide-registration/list-n-disinfectants-use-against-sars-cov-2.  5. Cover your mouth and nose with a mask or other face covering to avoid spreading germs.  6. Wash your hands and face often. Use soap and water.  7. Caregivers in these groups are at risk for severe illness due to COVID-19:  1. People 65 years and older  2. People who live in a nursing home or long-term care facility  3. People with chronic disease (lung, heart, cancer, diabetes, kidney, liver, immunologic)  4. People who have a weakened immune system, including those who:    Are in cancer treatment    Take medicine that weakens the immune system, such as corticosteroids    Had a bone marrow or organ transplant    Have an " immune deficiency    Have poorly controlled HIV or AIDS    Are obese (body mass index of 40 or higher)    Smoke regularly  8. Caregivers should wear gloves while washing dishes, handling laundry and cleaning bedrooms and bathrooms.  9. Use caution when washing and drying laundry: Don't shake dirty laundry and use the warmest water setting that you can.  10. For more tips on managing your health at home, go to www.cdc.gov/coronavirus/2019-ncov/downloads/10Things.pdf.  How can I take care of myself at home?  1. Get lots of rest. Drink extra fluids (unless a doctor has told you not to).  2. Take Tylenol (acetaminophen) for fever or pain. If you have liver or kidney problems, ask your family doctor if it's OK to take Tylenol.   Adults can take either:  ? 650 mg (two 325 mg pills) every 4 to 6 hours, or   ? 1,000 mg (two 500 mg pills) every 8 hours as needed.  ? Note: Don't take more than 3,000 mg in one day. Acetaminophen is found in many medicines (both prescribed and over-the-counter medicines). Read all labels to be sure you don't take too much.   For children, check the Tylenol bottle for the right dose. The dose is based on the child's age or weight.  3. If you have other health problems (like cancer, heart failure, an organ transplant or severe kidney disease): Call your specialty clinic if you don't feel better in the next 2 days.  4. Know when to call 911. Emergency warning signs include:  ? Trouble breathing or shortness of breath  ? Chest pain or pressure that doesn't go away  ? Feeling confused like you haven't felt before, or not being able to wake up  ? Bluish-colored lips or face  5. If your doctor prescribed a blood thinner medicine: Follow their instructions.  Where can I get more information?  1.  [facility] [employer] [facility] - About COVID-19:   [email or web address]  2. CDC - If You're Sick: cdc.gov/coronavirus/2019-ncov/about/steps-when-sick.html  3. CDC - Ending Home Isolation:  www.cdc.gov/coronavirus/2019-ncov/hcp/disposition-in-home-patients.html  4. [facility] - Caring for Someone: www.cdc.gov/coronavirus/2019-ncov/if-you-are-sick/care-for-someone.html  5. [facility] - Interim Guidance for Hospital Discharge to Home: www.health.[facility]./diseases/coronavirus/hcp/hospdischarge.pdf  6. [facility] clinical trials (COVID-19 research studies): [email or web address]-clinical-trials  7. Below are the COVID-19 hotlines at the [place] Department of Health ([facility]). Interpreters are available.  ? For health questions: Call 989-234-1414 or 1-167.474.9958 (7 a.m. to 7 p.m.)  ? For questions about schools and childcare: Call 650-801-3791 or 1-618.303.1132 (7 a.m. to 7 p.m.)    For informational purposes only. Not to replace the advice of your health care provider. Clinically reviewed by Infection Prevention and the [facility] COVID-19 Clinical Team. Copyright   2020 [employer]. All rights reserved. [employer] 453827 - Rev 11/11/20.             SONIDO Uribe, CNP  [facility] Provider

## 2021-09-17 LAB
GROUP A STREP BY PCR: NOT DETECTED
SARS-COV-2 RNA RESP QL NAA+PROBE: NEGATIVE

## 2021-09-17 NOTE — PATIENT INSTRUCTIONS
Patient Education     Viral Upper Respiratory Illness (Adult)    You have a viral upper respiratory illness (URI), which is another term for the common cold. This illness is contagious during the first few days. It is spread through the air by coughing and sneezing. It may also be spread by direct contact (touching the sick person and then touching your own eyes, nose, or mouth). Frequent handwashing will decrease risk of spread. Most viral illnesses go away within 7 to 10 days with rest and simple home remedies. Sometimes the illness may last for several weeks. Antibiotics will not kill a virus, and they are generally not prescribed for this condition.  Home care    If symptoms are severe, rest at home for the first 2 to 3 days. When you resume activity, don't let yourself get too tired.    Don't smoke. If you need help stopping, talk with your healthcare provider.    Avoid being exposed to cigarette smoke (yours or others ).    You may use acetaminophen or ibuprofen to control pain and fever, unless another medicine was prescribed. If you have chronic liver or kidney disease, have ever had a stomach ulcer or gastrointestinal bleeding, or are taking blood-thinning medicines, talk with your healthcare provider before using these medicines. Aspirin should never be given to anyone under 18 years of age who is ill with a viral infection or fever. It may cause severe liver or brain damage.    Your appetite may be poor, so a light diet is fine. Stay well hydrated by drinking 6 to 8 glasses of fluids per day (water, soft drinks, juices, tea, or soup). Extra fluids will help loosen secretions in the nose and lungs.    Over-the-counter cold medicines will not shorten the length of time you re sick, but they may be helpful for the following symptoms: cough, sore throat, and nasal and sinus congestion. If you take prescription medicines, ask your healthcare provider or pharmacist which over-the-counter medicines are safe to  "use. (Note: Don't use decongestants if you have high blood pressure.)  Follow-up care  Follow up with your healthcare provider, or as advised.  When to seek medical advice  Call your healthcare provider right away if any of these occur:    Cough with lots of colored sputum (mucus)    Severe headache; face, neck, or ear pain    Difficulty swallowing due to throat pain    Fever of 100.4 F (38 C) or higher, or as directed by your healthcare provider  Call 911  Call 911 if any of these occur:    Chest pain, shortness of breath, wheezing, or difficulty breathing    Coughing up blood    Very severe pain with swallowing, especially if it goes along with a muffled voice   [employer] last reviewed this educational content on 6/1/2018 2000-2021 [employer]. All rights reserved. This information is not intended as a substitute for professional medical care. Always follow your healthcare professional's instructions.         After Your COVID-19 (Coronavirus) Test  You have been tested for COVID-19 (coronavirus).   If you'll have surgery in the next few days, we'll let you know ahead of time if you have the virus. Please call your surgeon's office with any questions.  For all other patients: Results are usually available in [employer] within 2 to 3 days.   If you do not have a [employer] account, you'll get a letter in the mail in about 7 to 10 days.   [employer] is often the fastest way to get test results. Please sign up if you do not already have a [employer] account. See the handout Getting COVID-19 Test Results in [employer] for help.  What if my test result is positive?  If your test is positive and you have not viewed your result in [employer], you'll get a phone call with your result. (A positive test means that you have the virus.)     Follow the tips under \"How do I self-isolate?\" below for 10 days (20 days if you have a weak immune system).    You don't need to be retested for COVID-19 before going back to school or work. " "As long as you're fever-free and feeling better, you can go back to school, work and other activities after waiting the 10 or 20 days.  What if I have questions after I get my results?  If you have questions about your results, please visit our testing website at [email or web address].   After 7 to 10 days, if you have not gotten your results:     Call [phone] ([phone]) and ask to speak with our COVID-19 results team.    If you're being treated at an infusion center: Call your infusion center directly.  What are the symptoms of COVID-19?  Cough, fever and trouble breathing are the most common signs of COVID-19.  Other symptoms can include new headaches, new muscle or body aches, new and unexplained fatigue (feeling very tired), chills, sore throat, congestion (stuffy or runny nose), diarrhea (loose poop), loss of taste or smell, belly pain, and nausea or vomiting (feeling sick to your stomach or throwing up).  You may already have symptoms of COVID-19, or they may show up later.  What should I do if I have symptoms?  If you're having surgery: Call your surgeon's office.  For all other patients: Stay home and away from others (self-isolate) until ...    You've had no fever--and no medicine that reduces fever--for 1 full day (24 hours), AND    Other symptoms have gotten better. For example, your cough or breathing has improved, AND    At least 10 days have passed since your symptoms first started.  How do I self-isolate?  1. Stay in your own room, even for meals. Use your own bathroom if you can.  2. Stay away from others in your home. No hugging, kissing or shaking hands. No visitors.  3. Don't go to work, school or anywhere else.  4. Clean \"high touch\" surfaces often (doorknobs, counters, handles). Use household cleaning spray or wipes. You'll find a full list of  on the EPA website: " www.epa.gov/pesticide-registration/list-n-disinfectants-use-against-sars-cov-2.  5. Cover your mouth and nose with a mask or other face covering to avoid spreading germs.  6. Wash your hands and face often. Use soap and water.  7. Caregivers in these groups are at risk for severe illness due to COVID-19:  1. People 65 years and older  2. People who live in a nursing home or long-term care facility  3. People with chronic disease (lung, heart, cancer, diabetes, kidney, liver, immunologic)  4. People who have a weakened immune system, including those who:    Are in cancer treatment    Take medicine that weakens the immune system, such as corticosteroids    Had a bone marrow or organ transplant    Have an immune deficiency    Have poorly controlled HIV or AIDS    Are obese (body mass index of 40 or higher)    Smoke regularly  8. Caregivers should wear gloves while washing dishes, handling laundry and cleaning bedrooms and bathrooms.  9. Use caution when washing and drying laundry: Don't shake dirty laundry and use the warmest water setting that you can.  10. For more tips on managing your health at home, go to www.cdc.gov/coronavirus/2019-ncov/downloads/10Things.pdf.  How can I take care of myself at home?  1. Get lots of rest. Drink extra fluids (unless a doctor has told you not to).  2. Take Tylenol (acetaminophen) for fever or pain. If you have liver or kidney problems, ask your family doctor if it's OK to take Tylenol.   Adults can take either:  ? 650 mg (two 325 mg pills) every 4 to 6 hours, or   ? 1,000 mg (two 500 mg pills) every 8 hours as needed.  ? Note: Don't take more than 3,000 mg in one day. Acetaminophen is found in many medicines (both prescribed and over-the-counter medicines). Read all labels to be sure you don't take too much.   For children, check the Tylenol bottle for the right dose. The dose is based on the child's age or weight.  3. If you have other health problems (like cancer, heart failure,  an organ transplant or severe kidney disease): Call your specialty clinic if you don't feel better in the next 2 days.  4. Know when to call 911. Emergency warning signs include:  ? Trouble breathing or shortness of breath  ? Chest pain or pressure that doesn't go away  ? Feeling confused like you haven't felt before, or not being able to wake up  ? Bluish-colored lips or face  5. If your doctor prescribed a blood thinner medicine: Follow their instructions.  Where can I get more information?  1. [facility] - About COVID-19:   [email or web address]  2. CDC - If You're Sick: cdc.gov/coronavirus/2019-ncov/about/steps-when-sick.html  3. Former[facility] - Ending Home Isolation: www.cdc.gov/coronavirus/2019-ncov/hcp/disposition-in-home-patients.html  4. [facility] - Caring for Someone: www.cdc.gov/coronavirus/2019-ncov/if-you-are-sick/care-for-someone.html  5. [facility] - Interim Guidance for Hospital Discharge to Home: www.health.[facility].[place]/diseases/coronavirus/hcp/hospdischarge.pdf  6. [facility] clinical trials (COVID-19 research studies): [email or web address]-clinical-trials  7. Below are the COVID-19 hotlines at the [place] [facility] ([facility]). Interpreters are available.  ? For health questions: Call 433-627-9934 or [phone] (7 a.m. to 7 p.m.)  ? For questions about schools and childcare: Call 841-995-9189 or 1-274.633.5546 (7 a.m. to 7 p.m.)    For informational purposes only. Not to replace the advice of your health care provider. Clinically reviewed by Infection Prevention and the [facility] COVID-19 Clinical Team. Copyright   2020 [employer]. All rights reserved. [employer] 614693 - Rev 11/11/20.

## 2021-10-24 ENCOUNTER — HEALTH MAINTENANCE LETTER (OUTPATIENT)
Age: 31
End: 2021-10-24

## 2022-02-18 ENCOUNTER — IMMUNIZATION (OUTPATIENT)
Dept: NURSING | Facility: CLINIC | Age: 32
End: 2022-02-18
Payer: COMMERCIAL

## 2022-02-18 PROCEDURE — 0054A COVID-19,PF,PFIZER (12+ YRS): CPT

## 2022-02-18 PROCEDURE — 91305 COVID-19,PF,PFIZER (12+ YRS): CPT

## 2022-10-15 ENCOUNTER — HEALTH MAINTENANCE LETTER (OUTPATIENT)
Age: 32
End: 2022-10-15

## 2023-10-29 ENCOUNTER — HEALTH MAINTENANCE LETTER (OUTPATIENT)
Age: 33
End: 2023-10-29

## 2023-11-14 ENCOUNTER — OFFICE VISIT (OUTPATIENT)
Dept: URGENT CARE | Facility: URGENT CARE | Age: 33
End: 2023-11-14

## 2023-11-14 VITALS
HEART RATE: 118 BPM | RESPIRATION RATE: 18 BRPM | SYSTOLIC BLOOD PRESSURE: 140 MMHG | OXYGEN SATURATION: 96 % | TEMPERATURE: 98.1 F | DIASTOLIC BLOOD PRESSURE: 102 MMHG

## 2023-11-14 DIAGNOSIS — R03.0 ELEVATED BLOOD PRESSURE READING WITHOUT DIAGNOSIS OF HYPERTENSION: ICD-10-CM

## 2023-11-14 DIAGNOSIS — M54.50 ACUTE BILATERAL LOW BACK PAIN, UNSPECIFIED WHETHER SCIATICA PRESENT: Primary | ICD-10-CM

## 2023-11-14 PROCEDURE — 99204 OFFICE O/P NEW MOD 45 MIN: CPT | Performed by: FAMILY MEDICINE

## 2023-11-14 RX ORDER — DICLOFENAC SODIUM 75 MG/1
75 TABLET, DELAYED RELEASE ORAL 2 TIMES DAILY
Qty: 14 TABLET | Refills: 0 | Status: SHIPPED | OUTPATIENT
Start: 2023-11-14 | End: 2023-11-21

## 2023-11-14 RX ORDER — PREDNISONE 20 MG/1
80 TABLET ORAL DAILY
Qty: 20 TABLET | Refills: 0 | Status: SHIPPED | OUTPATIENT
Start: 2023-11-14 | End: 2023-11-19

## 2023-11-14 NOTE — PATIENT INSTRUCTIONS
Prednisone 80 mg daily x 5 days.  Take this as early in the day as possible.    Diclofenac 75 mg twice daily for 7 days.  Do not take ibuprofen or naproxen while you're using diclofenac.    If not improving, please follow up with spine care team.  If acutely worsening, especially if any bowel or bladder dysfunction, to ER ASAP.     Follow up with primary care regarding elevated blood pressure if you continue to have elevated readings outside a clinic setting.  (Check at the pharmacy using the machine there.)

## 2023-11-15 NOTE — PROGRESS NOTES
ASSESSMENT:    ICD-10-CM    1. Acute bilateral low back pain, unspecified whether sciatica present  M54.50 predniSONE (DELTASONE) 20 MG tablet     diclofenac (VOLTAREN) 75 MG EC tablet     Spine  Referral      2. Elevated blood pressure reading without diagnosis of hypertension  R03.0       Right foot drop in the setting of acute bilateral low back pain.  Suspect compression of L4 nerve root given pattern of sensation changes and foot drop.  Elevated blood pressure on recheck, encouraged to recheck outside of clinic/medical setting.    No cauda equina syndrome.  Very low suspicion for infectious etiology contributing to symptoms, but given foreign travel just prior to onset, will need additional investigation of this possibility if other causes are not determined and he is not responding to tx.    PLAN:  Patient Instructions   Prednisone 80 mg daily x 5 days.  Take this as early in the day as possible.    Diclofenac 75 mg twice daily for 7 days.  Do not take ibuprofen or naproxen while you're using diclofenac.    If not improving, please follow up with spine care team.  If acutely worsening, especially if any bowel or bladder dysfunction, to ER ASAP.     Follow up with primary care regarding elevated blood pressure if you continue to have elevated readings outside a clinic setting.  (Check at the pharmacy using the machine there.)    Referral to spine team entered for follow up if not improving.    SUBJECTIVE:  Bravo Ortega is a 33 year old who presents to  with inability to dorsiflex right foot, loss of sensation from knee down on the right, tingling down left leg and lower back soreness. Bravo reports recently returning from [facility] and prior to 3-4 days ago was in his usual state of health. He did have a very long day of travel, in which he was in transit for almost 24 hours.  He denies any trauma or injury to his back. No loss of bowel or bladder. He has used a heating pad to help with the  soreness in back.     OBJECTIVE:  BP (!) 140/102 (BP Location: Right arm, Patient Position: Sitting, Cuff Size: Adult Regular)   Pulse 118   Temp 98.1  F (36.7  C) (Tympanic)   Resp 18   SpO2 96%   Physical Exam  Constitutional:       General: He is not in acute distress.     Appearance: He is well-groomed. He is not ill-appearing or toxic-appearing.   Neurological:      Mental Status: He is alert.      Sensory: Sensory deficit present.      Motor: Weakness present.      Deep Tendon Reflexes:      Reflex Scores:       Patellar reflexes are 1+ on the right side and 1+ on the left side.     Comments: Numbness and weakness in right lower extremity below the knee. Dorsiflexion impaired in right foot.       Numbness is specifically in the L4 dermatome.

## 2024-12-21 ENCOUNTER — HEALTH MAINTENANCE LETTER (OUTPATIENT)
Age: 34
End: 2024-12-21